# Patient Record
Sex: FEMALE | Race: WHITE | NOT HISPANIC OR LATINO | Employment: FULL TIME | URBAN - METROPOLITAN AREA
[De-identification: names, ages, dates, MRNs, and addresses within clinical notes are randomized per-mention and may not be internally consistent; named-entity substitution may affect disease eponyms.]

---

## 2021-11-18 ENCOUNTER — LAB VISIT (OUTPATIENT)
Dept: LAB | Facility: OTHER | Age: 56
End: 2021-11-18
Attending: OTOLARYNGOLOGY
Payer: COMMERCIAL

## 2021-11-18 ENCOUNTER — OFFICE VISIT (OUTPATIENT)
Dept: OTOLARYNGOLOGY | Facility: CLINIC | Age: 56
End: 2021-11-18
Payer: COMMERCIAL

## 2021-11-18 VITALS
HEART RATE: 66 BPM | TEMPERATURE: 98 F | BODY MASS INDEX: 27.31 KG/M2 | WEIGHT: 154.13 LBS | RESPIRATION RATE: 20 BRPM | HEIGHT: 63 IN | DIASTOLIC BLOOD PRESSURE: 85 MMHG | SYSTOLIC BLOOD PRESSURE: 142 MMHG | OXYGEN SATURATION: 96 %

## 2021-11-18 DIAGNOSIS — K14.6 BURNING MOUTH SYNDROME: ICD-10-CM

## 2021-11-18 DIAGNOSIS — K14.0 GLOSSITIS: ICD-10-CM

## 2021-11-18 DIAGNOSIS — K14.6 BURNING MOUTH SYNDROME: Primary | ICD-10-CM

## 2021-11-18 LAB
FOLATE SERPL-MCNC: 14.1 NG/ML (ref 4–24)
IRON SERPL-MCNC: 71 UG/DL (ref 30–160)
SATURATED IRON: 16 % (ref 20–50)
TOTAL IRON BINDING CAPACITY: 443 UG/DL (ref 250–450)
TRANSFERRIN SERPL-MCNC: 299 MG/DL (ref 200–375)
VIT B12 SERPL-MCNC: 721 PG/ML (ref 210–950)

## 2021-11-18 PROCEDURE — 82607 VITAMIN B-12: CPT | Performed by: OTOLARYNGOLOGY

## 2021-11-18 PROCEDURE — 84425 ASSAY OF VITAMIN B-1: CPT | Performed by: OTOLARYNGOLOGY

## 2021-11-18 PROCEDURE — 99204 PR OFFICE/OUTPT VISIT, NEW, LEVL IV, 45-59 MIN: ICD-10-PCS | Mod: S$GLB,,, | Performed by: OTOLARYNGOLOGY

## 2021-11-18 PROCEDURE — 84466 ASSAY OF TRANSFERRIN: CPT | Performed by: OTOLARYNGOLOGY

## 2021-11-18 PROCEDURE — 99204 OFFICE O/P NEW MOD 45 MIN: CPT | Mod: S$GLB,,, | Performed by: OTOLARYNGOLOGY

## 2021-11-18 PROCEDURE — 84207 ASSAY OF VITAMIN B-6: CPT | Performed by: OTOLARYNGOLOGY

## 2021-11-18 PROCEDURE — 84630 ASSAY OF ZINC: CPT | Performed by: OTOLARYNGOLOGY

## 2021-11-18 PROCEDURE — 82746 ASSAY OF FOLIC ACID SERUM: CPT | Performed by: OTOLARYNGOLOGY

## 2021-11-18 RX ORDER — NAPROXEN SODIUM 220 MG
220 TABLET ORAL
COMMUNITY

## 2021-11-18 RX ORDER — MELOXICAM 15 MG/1
TABLET ORAL
COMMUNITY
Start: 2021-11-08

## 2021-11-18 RX ORDER — OMEPRAZOLE 40 MG/1
40 CAPSULE, DELAYED RELEASE ORAL 2 TIMES DAILY
COMMUNITY
Start: 2021-07-26

## 2021-11-18 RX ORDER — LEVOTHYROXINE SODIUM 75 UG/1
75 TABLET ORAL
COMMUNITY

## 2021-11-18 RX ORDER — KETOROLAC TROMETHAMINE 10 MG/1
TABLET, FILM COATED ORAL
COMMUNITY
Start: 2021-11-07

## 2021-11-22 ENCOUNTER — PATIENT MESSAGE (OUTPATIENT)
Dept: OTOLARYNGOLOGY | Facility: CLINIC | Age: 56
End: 2021-11-22
Payer: COMMERCIAL

## 2021-11-22 LAB — ZINC SERPL-MCNC: 66 UG/DL (ref 60–130)

## 2021-11-23 ENCOUNTER — PATIENT MESSAGE (OUTPATIENT)
Dept: OTOLARYNGOLOGY | Facility: CLINIC | Age: 56
End: 2021-11-23
Payer: COMMERCIAL

## 2021-11-23 LAB
PYRIDOXAL SERPL-MCNC: 9 UG/L (ref 5–50)
VIT B1 BLD-MCNC: 64 UG/L (ref 38–122)

## 2022-04-14 DIAGNOSIS — K14.0 GLOSSITIS: ICD-10-CM

## 2022-04-14 DIAGNOSIS — K14.6 BURNING MOUTH SYNDROME: ICD-10-CM

## 2022-04-24 ENCOUNTER — PATIENT MESSAGE (OUTPATIENT)
Dept: OTOLARYNGOLOGY | Facility: CLINIC | Age: 57
End: 2022-04-24
Payer: COMMERCIAL

## 2022-04-25 ENCOUNTER — TELEPHONE (OUTPATIENT)
Dept: OTOLARYNGOLOGY | Facility: CLINIC | Age: 57
End: 2022-04-25
Payer: COMMERCIAL

## 2022-04-25 NOTE — TELEPHONE ENCOUNTER
Called the patient and did inform her that I have tried a few times to call the pharmacy to call in the prescription that Dr. Acosta ordered on 4/19.   She states they have been closed some days and have not been able to reach them either.  She states she does have some of the medications still and she confirmed that she was only in [place] for a film.  I explained the he would fill the medication this time but she should follow up with a local doctor to refill after this prescription.  She voiced understanding.  I told her that Dr. Acosta is out of town and will be back on Thursday.  I will ask if he could try to send again as a e-script and if the pharmacy is still having issues getting it, then she should give us a call back.